# Patient Record
Sex: MALE | Race: WHITE | NOT HISPANIC OR LATINO | Employment: FULL TIME | URBAN - METROPOLITAN AREA
[De-identification: names, ages, dates, MRNs, and addresses within clinical notes are randomized per-mention and may not be internally consistent; named-entity substitution may affect disease eponyms.]

---

## 2024-05-28 ENCOUNTER — OFFICE VISIT (OUTPATIENT)
Dept: FAMILY MEDICINE CLINIC | Facility: CLINIC | Age: 29
End: 2024-05-28
Payer: COMMERCIAL

## 2024-05-28 VITALS
WEIGHT: 268 LBS | BODY MASS INDEX: 35.52 KG/M2 | DIASTOLIC BLOOD PRESSURE: 93 MMHG | HEART RATE: 72 BPM | HEIGHT: 73 IN | SYSTOLIC BLOOD PRESSURE: 144 MMHG | OXYGEN SATURATION: 98 %

## 2024-05-28 DIAGNOSIS — L05.91 CHRONIC RECURRENT PILONIDAL CYST: Primary | ICD-10-CM

## 2024-05-28 DIAGNOSIS — R03.0 ELEVATED BLOOD PRESSURE READING: ICD-10-CM

## 2024-05-28 PROCEDURE — 99203 OFFICE O/P NEW LOW 30 MIN: CPT | Performed by: NURSE PRACTITIONER

## 2024-05-28 NOTE — PROGRESS NOTES
"Chief Complaint  Establish Care and Cyst    SUBJECTIVE  Danny Shaikh presents to [facility] to establish care. Previous PCP was in [place]. Records requested.     Pt has a cyst on backside that is painful. Pt reports he noticed it three years ago, states since that time it has swelled up and became reinfected several times, states he is tired of dealing with it and would like to get it removed.  Patient states it became very swollen and painful about a week or so ago, has since then opened and drained on its own    History of Present Illness  History reviewed. No pertinent past medical history.   History reviewed. No pertinent family history.   History reviewed. No pertinent surgical history.   No current outpatient medications on file.    OBJECTIVE  Vital Signs:   /93   Pulse 72   Ht 185.4 cm (73\")   Wt 122 kg (268 lb)   SpO2 98%   BMI 35.36 kg/m²    Estimated body mass index is 35.36 kg/m² as calculated from the following:    Height as of this encounter: 185.4 cm (73\").    Weight as of this encounter: 122 kg (268 lb).     Wt Readings from Last 3 Encounters:   05/28/24 122 kg (268 lb)     BP Readings from Last 3 Encounters:   05/28/24 144/93       Physical Exam  Vitals reviewed.   Constitutional:       Appearance: Normal appearance. He is well-developed.   HENT:      Head: Normocephalic and atraumatic.      Right Ear: External ear normal.      Left Ear: External ear normal.   Eyes:      Conjunctiva/sclera: Conjunctivae normal.      Pupils: Pupils are equal, round, and reactive to light.   Cardiovascular:      Rate and Rhythm: Normal rate and regular rhythm.      Heart sounds: No murmur heard.     No friction rub. No gallop.   Pulmonary:      Effort: Pulmonary effort is normal.      Breath sounds: Normal breath sounds. No wheezing or rhonchi.   Skin:     General: Skin is warm and dry.             Comments: Pilonidal cyst right side gluteal cleft, nontender present, " no drainage   Neurological:      Mental Status: He is alert and oriented to person, place, and time.      Cranial Nerves: No cranial nerve deficit.   Psychiatric:         Mood and Affect: Mood and affect normal.         Behavior: Behavior normal.         Thought Content: Thought content normal.         Judgment: Judgment normal.          Result Review                                No Images in the past 120 days found..     The above data has been reviewed by SUJEY Jiménez 05/28/2024 08:04 EDT.          Patient Care Team:  Emily Wilde APRN as PCP - General (Nurse Practitioner)    BMI cannot be calculated due to outdated height or weight values.  Please input a current height/weight in Vitals and re-renter BMIFOLLOWUP in Note to pull in correct documentation based on BMI range.       ASSESSMENT & PLAN    Diagnoses and all orders for this visit:    1. Chronic recurrent pilonidal cyst (Primary)  -     Ambulatory Referral to General Surgery    2. Elevated blood pressure reading  Assessment & Plan:  Blood pressure 140/86 on manual recheck, discussed with patient I would like for him to monitor his blood pressure for the next couple of weeks, we will follow-up in 1 month for reevaluation, we will complete CPE and labs at that time, discussed diet and exercise changes and weight loss           Tobacco Use: High Risk (5/28/2024)    Patient History     Smoking Tobacco Use: Never     Smokeless Tobacco Use: Current     Passive Exposure: Not on file       Follow Up     Return in about 4 weeks (around 6/25/2024), or if symptoms worsen or fail to improve, for Annual physical.        Patient was given instructions and counseling regarding his condition or for health maintenance advice. Please see specific information pulled into the AVS if appropriate.   I have reviewed information obtained and documented by others and I have confirmed the accuracy of this documented note.    SUJEY Jiménez

## 2024-05-28 NOTE — ASSESSMENT & PLAN NOTE
Blood pressure 140/86 on manual recheck, discussed with patient I would like for him to monitor his blood pressure for the next couple of weeks, we will follow-up in 1 month for reevaluation, we will complete CPE and labs at that time, discussed diet and exercise changes and weight loss